# Patient Record
Sex: MALE | Race: WHITE
[De-identification: names, ages, dates, MRNs, and addresses within clinical notes are randomized per-mention and may not be internally consistent; named-entity substitution may affect disease eponyms.]

---

## 2018-12-16 ENCOUNTER — HOSPITAL ENCOUNTER (OUTPATIENT)
Dept: HOSPITAL 62 - ER | Age: 38
Setting detail: OBSERVATION
LOS: 2 days | Discharge: HOME | End: 2018-12-18
Attending: ORTHOPAEDIC SURGERY | Admitting: ORTHOPAEDIC SURGERY
Payer: OTHER GOVERNMENT

## 2018-12-16 DIAGNOSIS — S76.112A: Primary | ICD-10-CM
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
## 2018-12-16 DIAGNOSIS — E66.3: ICD-10-CM
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
## 2018-12-16 PROCEDURE — 97163 PT EVAL HIGH COMPLEX 45 MIN: CPT

## 2018-12-16 PROCEDURE — 85025 COMPLETE CBC W/AUTO DIFF WBC: CPT

## 2018-12-16 PROCEDURE — 85730 THROMBOPLASTIN TIME PARTIAL: CPT

## 2018-12-16 PROCEDURE — 97116 GAIT TRAINING THERAPY: CPT

## 2018-12-16 PROCEDURE — 73700 CT LOWER EXTREMITY W/O DYE: CPT

## 2018-12-16 PROCEDURE — 36415 COLL VENOUS BLD VENIPUNCTURE: CPT

## 2018-12-16 PROCEDURE — 85610 PROTHROMBIN TIME: CPT

## 2018-12-16 PROCEDURE — 80048 BASIC METABOLIC PNL TOTAL CA: CPT

## 2018-12-16 PROCEDURE — 27599 UNLISTED PX FEMUR/KNEE: CPT

## 2018-12-16 PROCEDURE — 01320 ANES ALL PX NRV MSC KNE&/POP: CPT

## 2018-12-16 PROCEDURE — L1830 KO IMMOB CANVAS LONG PRE OTS: HCPCS

## 2018-12-16 PROCEDURE — 85027 COMPLETE CBC AUTOMATED: CPT

## 2018-12-16 PROCEDURE — 96374 THER/PROPH/DIAG INJ IV PUSH: CPT

## 2018-12-16 PROCEDURE — C1713 ANCHOR/SCREW BN/BN,TIS/BN: HCPCS

## 2018-12-16 PROCEDURE — 99285 EMERGENCY DEPT VISIT HI MDM: CPT

## 2018-12-16 NOTE — RADIOLOGY REPORT (SQ)
EXAM DESCRIPTION:  CT LT LOWER EXTREMITY WITHOUT



COMPLETED DATE/TIME:  12/16/2018 3:01 pm



REASON FOR STUDY:  ruptured patella tendon left.



COMPARISON:  None.



TECHNIQUE:  Axial imaging performed through the left knee with reformatted coronal and sagittal imagi
ng windowed for bone and soft tissues.

Images saved to PACS.

3D IMAGING: Were 3D images as MIP, SSD, or volume rendering performed at the work station? No.

All CT scanners at this facility use dose modulation, iterative reconstruction, and/or weight based d
osing when appropriate to reduce radiation dose to as low as reasonably achievable (ALARA).

[facility]: Dose Right  [facility]: CareDose    [facility]: Dose Right    [facility]: Teradose 4D    [facility]: Smart Technologies



LIMITATIONS:  None.



RADIATION DOSE:  CT Rad equipment meets quality standard of care and radiation dose reduction techniq
ues were employed. CTDIvol: 4.1 mGy. DLP: 140 mGy-cm. mGy.



FINDINGS:  SOFT TISSUES: Disruption of the normal architecture of patellar tendon consistent with cli
nical history.

BONES: Patella Sarah.  No fracture.

MINERALIZATION: Normal.

OTHER: No other significant finding.



IMPRESSION:  Ruptured patellar tendon.  No fracture.



TECHNICAL DOCUMENTATION:  JOB ID:  [id number]

Quality ID # [id number]: Final reports with documentation of one or more dose reduction techniques (e.g., Au
tomated exposure control, adjustment of the mA and/or kV according to patient size, use of iterative 
reconstruction technique)

 2011 [employer]- All Rights Reserved



Reading location - IP/workstation name: [email or web address]

## 2018-12-17 LAB
ADD MANUAL DIFF: NO
ANION GAP SERPL CALC-SCNC: 9 MMOL/L (ref 5–19)
APTT BLD: 35 SEC (ref 23.5–35.8)
BASOPHILS # BLD AUTO: 0.1 10^3/UL (ref 0–0.2)
BASOPHILS NFR BLD AUTO: 0.7 % (ref 0–2)
BUN SERPL-MCNC: 13 MG/DL (ref 7–20)
CALCIUM: 9.2 MG/DL (ref 8.4–10.2)
CHLORIDE SERPL-SCNC: 105 MMOL/L (ref 98–107)
CO2 SERPL-SCNC: 27 MMOL/L (ref 22–30)
EOSINOPHIL # BLD AUTO: 0.1 10^3/UL (ref 0–0.6)
EOSINOPHIL NFR BLD AUTO: 1.1 % (ref 0–6)
ERYTHROCYTE [DISTWIDTH] IN BLOOD BY AUTOMATED COUNT: 13 % (ref 11.5–14)
GLUCOSE SERPL-MCNC: 104 MG/DL (ref 75–110)
HCT VFR BLD CALC: 40.8 % (ref 37.9–51)
HGB BLD-MCNC: 14.1 G/DL (ref 13.5–17)
INR PPP: 0.97
LYMPHOCYTES # BLD AUTO: 2.5 10^3/UL (ref 0.5–4.7)
LYMPHOCYTES NFR BLD AUTO: 25.1 % (ref 13–45)
MCH RBC QN AUTO: 30.5 PG (ref 27–33.4)
MCHC RBC AUTO-ENTMCNC: 34.7 G/DL (ref 32–36)
MCV RBC AUTO: 88 FL (ref 80–97)
MONOCYTES # BLD AUTO: 0.9 10^3/UL (ref 0.1–1.4)
MONOCYTES NFR BLD AUTO: 8.6 % (ref 3–13)
NEUTROPHILS # BLD AUTO: 6.6 10^3/UL (ref 1.7–8.2)
NEUTS SEG NFR BLD AUTO: 64.5 % (ref 42–78)
PLATELET # BLD: 241 10^3/UL (ref 150–450)
POTASSIUM SERPL-SCNC: 4.7 MMOL/L (ref 3.6–5)
PROTHROMBIN TIME: 13.4 SEC (ref 11.4–15.4)
RBC # BLD AUTO: 4.63 10^6/UL (ref 4.35–5.55)
SODIUM SERPL-SCNC: 141 MMOL/L (ref 137–145)
TOTAL CELLS COUNTED % (AUTO): 100 %
WBC # BLD AUTO: 10.2 10^3/UL (ref 4–10.5)

## 2018-12-17 PROCEDURE — 0LQR0ZZ REPAIR LEFT KNEE TENDON, OPEN APPROACH: ICD-10-PCS | Performed by: ORTHOPAEDIC SURGERY

## 2018-12-17 RX ADMIN — FENTANYL CITRATE ONE MCG: 50 INJECTION INTRAMUSCULAR; INTRAVENOUS at 13:40

## 2018-12-17 RX ADMIN — FENTANYL CITRATE ONE MCG: 50 INJECTION INTRAMUSCULAR; INTRAVENOUS at 13:49

## 2018-12-17 RX ADMIN — MORPHINE SULFATE PRN MG: 10 INJECTION INTRAMUSCULAR; INTRAVENOUS; SUBCUTANEOUS at 23:15

## 2018-12-17 RX ADMIN — OXYCODONE HYDROCHLORIDE PRN MG: 5 TABLET ORAL at 06:16

## 2018-12-17 RX ADMIN — MORPHINE SULFATE PRN MG: 10 INJECTION INTRAMUSCULAR; INTRAVENOUS; SUBCUTANEOUS at 16:55

## 2018-12-17 RX ADMIN — CEFAZOLIN SODIUM SCH MLS/HR: 2 SOLUTION INTRAVENOUS at 18:15

## 2018-12-17 RX ADMIN — FENTANYL CITRATE ONE MCG: 50 INJECTION INTRAMUSCULAR; INTRAVENOUS at 13:32

## 2018-12-17 RX ADMIN — MORPHINE SULFATE PRN MG: 10 INJECTION INTRAMUSCULAR; INTRAVENOUS; SUBCUTANEOUS at 20:46

## 2018-12-17 RX ADMIN — OXYCODONE HYDROCHLORIDE PRN MG: 5 TABLET ORAL at 18:13

## 2018-12-17 NOTE — OPERATIVE REPORT
Operative Report


DATE OF SURGERY: 12/17/18


PREOPERATIVE DIAGNOSIS: Left patellar tendon disruption


OPERATION: Repair left patellar tendon


SURGEON: MYRON SILVESTRE


ANESTHESIA: GA


ESTIMATED BLOOD LOSS: 50


PROCEDURE: 





With the patient supine and operative table left lower extremities prepped and 

draped in sterile fashion.  The limb is elevated for exsanguination tourniquet 

inflated 280 torr.  A midline incision is made beginning at the proximal pole 

of the patella and extending down to the tibial tubercle.  Sharp dissection was 

carried incision through the superficial retinaculum exposing the underlying 

patellar tendon disruption.  Two 4.5 mm Arthrex suture anchors were placed into 

the inferior pole of the patella.  The FiberWire sutures and emanate from the 

anchors were used to repair the patellar tendon as follows.  The 2 central 

sutures were used to repair the patellar tendon proper.  The 2 medial and 

lateral sutures were then used to repair the retinacular tear medial and 

lateral.  Lastly a #5 FiberWire was placed through bone hole at the level of 

the tibial tubercle and then proximally in the soft tissue at the proximal pole 

of the patella to serve as a overall constraint of distraction across the 

patellar tendon.





The wound is irrigated.  Tourniquet deflated.  Hemostasis obtained with 

electrocautery.  Wound was then closed in layers interrupted Vicryl followed by 

nylon.  A sterile compressive dressing was applied followed by knee immobilizer 

and the patient's return to PACU in satisfactory condition.

## 2018-12-17 NOTE — PDOC H&P
History of Present Illness


Admission Date/PCP: 


  12/16/18 15:49





  [facility]





History of Present Illness: 


LORI NUNEZ is a 38 year old male


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 

decking material and sustained a twisting and buckling action to the left knee 

resulting in a left knee injury.  He was evaluated in the emergency room her 

left patellar tendon disruption was identified.  He is admitted to orthopedic 

service for management of the patellar tendon disruption.





Past Medical History


Medical History: None


Cardiac Medical History: Reports: None





Past Surgical History


Past Surgical History: Reports: None





Social History


Information Source: Patient, [facility] Records


Lives with: Family


Smoking Status: Never Smoker


Frequency of Alcohol Use: Heavy


Hx Recreational Drug Use: No


Hx Prescription Drug Abuse: No





Family History


Family History: Reviewed & Not Pertinent


Parental Family History Reviewed: No


Children Family History Reviewed: No


Sibling(s) Family History Reviewed.: No





Medication/Allergy


Home Medications: 








No Home Medications  12/16/18 








Allergies/Adverse Reactions: 


 





No Known Allergies Allergy (Unverified 12/16/18 16:18)


 











Review of Systems


All systems: as per [facility]





Physical Exam


Vital Signs: 


 











Temp Pulse Resp BP Pulse Ox


 


 36.7 C   61   17   137/68 H  100 


 


 12/16/18 23:03  12/16/18 23:03  12/16/18 23:03  12/16/18 23:03  12/16/18 23:03








 Intake & Output











 12/16/18 12/17/18 12/18/18





 06:59 06:59 06:59


 


Intake Total  1444 


 


Output Total  350 


 


Balance  1094 


 


Weight  115.2 kg 











Physical Exam: 





The patient is an overweight middle-aged white male lying in a hospital bed in 

minimal discomfort.  Left lower extremity is immobilized in a knee immobilizer.

  This is removed.  There is swelling and tenderness in the region of the 

infrapatellar aspect of the left knee.  Extensor mechanism is disrupted.  

Distal neurovascular examination is intact.  There is no skin abnormalities.


General appearance: PRESENT: no acute distress, mild distress


Head exam: PRESENT: normocephalic


Respiratory exam: PRESENT: unlabored


Cardiovascular exam: PRESENT: RRR


Pulses: PRESENT: +1 pedal pulses bilateral


Vascular exam: PRESENT: normal capillary refill


GI/Abdominal exam: PRESENT: soft


Rectal exam: PRESENT: deferred


Extremities exam: PRESENT: other - Swelling and tenderness in the left 

infrapatellar region.  Extensor mechanism is disrupted.  Distal neurovascular 

examination is intact.  No skin abnormalities.


Neurological exam: PRESENT: alert, awake, oriented to person, oriented to place

, oriented to time, oriented to situation.  ABSENT: motor sensory deficit


Psychiatric exam: PRESENT: appropriate affect, normal mood.  ABSENT: homicidal 

ideation, suicidal ideation


Skin exam: PRESENT: dry, intact, warm.  ABSENT: cyanosis, rash





Results


Laboratory Results: 


 





 12/17/18 05:02 





 12/17/18 05:02 





 











  12/17/18 12/17/18





  05:02 05:02


 


WBC  10.2 


 


RBC  4.63 


 


Hgb  14.1 


 


Hct  40.8 


 


MCV  88 


 


MCH  30.5 


 


MCHC  34.7 


 


RDW  13.0 


 


Plt Count  241 


 


Seg Neutrophils %  64.5 


 


Lymphocytes %  25.1 


 


Monocytes %  8.6 


 


Eosinophils %  1.1 


 


Basophils %  0.7 


 


Absolute Neutrophils  6.6 


 


Absolute Lymphocytes  2.5 


 


Absolute Monocytes  0.9 


 


Absolute Eosinophils  0.1 


 


Absolute Basophils  0.1 


 


Sodium   141.0


 


Potassium   4.7


 


Chloride   105


 


Carbon Dioxide   27


 


Anion Gap   9


 


BUN   13


 


Creatinine   0.91


 


Est GFR ( Amer)   > 60


 


Est GFR (Non-Af Amer)   > 60


 


Glucose   104


 


Calcium   9.2











Impressions: 


 





Lower Extremity CT  12/16/18 14:16


IMPRESSION:  Ruptured patellar tendon.  No fracture.


 











Status: Imported from PACS





Assessment & Plan





- Diagnosis


(1) Rupture of left patellar tendon


Qualifiers: 


   Encounter type: initial encounter   Qualified Code(s): S86.812A - Strain of 

other muscle(s) and tendon(s) at lower leg level, left leg, initial encounter   


Is this a current diagnosis for this admission?: Yes   


Plan: 


38-year-old white male with an acute disruption of the left patella tendon.  

Patient be best served with a surgical repair.  We will proceed with this 

pending or availability.








- Time


Time Spent: 50 to 70 Minutes


Anticipated discharge: Home


Within: within 24 hours

## 2018-12-18 VITALS — DIASTOLIC BLOOD PRESSURE: 64 MMHG | SYSTOLIC BLOOD PRESSURE: 151 MMHG

## 2018-12-18 LAB
ANION GAP SERPL CALC-SCNC: 8 MMOL/L (ref 5–19)
BUN SERPL-MCNC: 10 MG/DL (ref 7–20)
CALCIUM: 9.2 MG/DL (ref 8.4–10.2)
CHLORIDE SERPL-SCNC: 104 MMOL/L (ref 98–107)
CO2 SERPL-SCNC: 24 MMOL/L (ref 22–30)
ERYTHROCYTE [DISTWIDTH] IN BLOOD BY AUTOMATED COUNT: 13.1 % (ref 11.5–14)
GLUCOSE SERPL-MCNC: 114 MG/DL (ref 75–110)
HCT VFR BLD CALC: 38.4 % (ref 37.9–51)
HGB BLD-MCNC: 13.2 G/DL (ref 13.5–17)
MCH RBC QN AUTO: 30 PG (ref 27–33.4)
MCHC RBC AUTO-ENTMCNC: 34.3 G/DL (ref 32–36)
MCV RBC AUTO: 88 FL (ref 80–97)
PLATELET # BLD: 252 10^3/UL (ref 150–450)
POTASSIUM SERPL-SCNC: 4.5 MMOL/L (ref 3.6–5)
RBC # BLD AUTO: 4.39 10^6/UL (ref 4.35–5.55)
SODIUM SERPL-SCNC: 136 MMOL/L (ref 137–145)
WBC # BLD AUTO: 13.6 10^3/UL (ref 4–10.5)

## 2018-12-18 RX ADMIN — CEFAZOLIN SODIUM SCH MLS/HR: 2 SOLUTION INTRAVENOUS at 01:49

## 2018-12-18 RX ADMIN — OXYCODONE HYDROCHLORIDE PRN MG: 5 TABLET ORAL at 06:31

## 2018-12-18 RX ADMIN — ACETAMINOPHEN PRN MG: 325 TABLET ORAL at 11:28

## 2018-12-18 RX ADMIN — OXYCODONE HYDROCHLORIDE PRN MG: 5 TABLET ORAL at 00:19

## 2018-12-18 RX ADMIN — ACETAMINOPHEN PRN MG: 325 TABLET ORAL at 04:02

## 2018-12-18 NOTE — PDOC DISCHARGE SUMMARY
General





- Admit/Disc Date/PCP


Admission Date/Primary Care Provider: 


  12/16/18 15:49





  [facility]





Discharge Date: 12/18/18





- Discharge Diagnosis


(1) Rupture of left patellar tendon


Is this a current diagnosis for this admission?: Yes   





- Additional Information


Resuscitation Status: Full Code


Home Medications: 








No Home Medications  12/16/18 











History of Present Illness


History of Present Illness: 





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 

lower extremity injury.  Evaluation emergency room demonstrated disruption of 

the patellar tendon.  Patient is admitted to the orthopedic service for 

management of the patella tendon disruption.





Hospital Course


Hospital Course: 





Patient was taken to the operating room and underwent a repair of the left 

patella tendon under general anesthetic.  He is returned to floor in 

satisfactory condition.  Pain is reasonably well controlled with oral 

analgesics.  Patient is ablating with crutches and a knee immobilizer.





Physical Exam


Vital Signs: 


 











Temp Pulse Resp BP Pulse Ox


 


 37.1 C   91   17   145/79 H  97 


 


 12/17/18 23:02  12/17/18 23:02  12/17/18 23:02  12/17/18 23:02  12/17/18 23:02








 Intake & Output











 12/17/18 12/18/18 12/19/18





 06:59 06:59 06:59


 


Intake Total 1444 1900 


 


Output Total 350 400 


 


Balance 1094 1500 


 


Weight 115.2 kg 113.5 kg 











General appearance: PRESENT: no acute distress, mild distress, well-developed


Head exam: PRESENT: normocephalic


Respiratory exam: PRESENT: unlabored


Cardiovascular exam: PRESENT: RRR


Pulses: PRESENT: +1 pedal pulses bilateral


Vascular exam: PRESENT: normal capillary refill


GI/Abdominal exam: PRESENT: soft


Rectal exam: PRESENT: deferred


Extremities exam: PRESENT: other - Left lower extremity clean dry and intact.  

Knee immobilizers in place.


Neurological exam: PRESENT: alert, awake, oriented to person, oriented to place

, oriented to time, oriented to situation.  ABSENT: motor sensory deficit


Psychiatric exam: PRESENT: appropriate affect, normal mood.  ABSENT: homicidal 

ideation, suicidal ideation


Skin exam: PRESENT: dry, intact, warm.  ABSENT: cyanosis, rash





Results


Laboratory Results: 


 





 12/18/18 05:24 





 12/18/18 05:24 





 











  12/18/18 12/18/18





  05:24 05:24


 


WBC  13.6 H 


 


RBC  4.39 


 


Hgb  13.2 L 


 


Hct  38.4 


 


MCV  88 


 


MCH  30.0 


 


MCHC  34.3 


 


RDW  13.1 


 


Plt Count  252 


 


Sodium   136.0 L


 


Potassium   4.5


 


Chloride   104


 


Carbon Dioxide   24


 


Anion Gap   8


 


BUN   10


 


Creatinine   0.72


 


Est GFR ( Amer)   > 60


 


Est GFR (Non-Af Amer)   > 60


 


Glucose   114 H


 


Calcium   9.2











Impressions: 


 





Lower Extremity CT  12/16/18 14:16


IMPRESSION:  Ruptured patellar tendon.  No fracture.


 











Status: Imported from PACS





Qualifiers





- *


PATIENT BEING DISCHARGED WITH ANY OF THE FOLLOWING DIAGNOSIS: No


VTE patient discharged on overlapping Therapy?: No


Reason(s) for not prescribing Overlap Therapy:: Not indicated





Plan


Discharge Plan: 





Patient be discharged home without supportive therapy because his son needed.  

He will maintain a crutch ambulation in the knee immobilizer.  Return to see 

Dr. Jose De Jesus Maldonado Bartlett for surgery in 2 weeks for staple removal.